# Patient Record
(demographics unavailable — no encounter records)

---

## 2024-12-08 NOTE — REVIEW OF SYSTEMS
[Joint Pain] : joint pain [Joint Stiffness] : joint stiffness [Negative] : Heme/Lymph [FreeTextEntry9] : l-spine, b/l legs/calves

## 2024-12-08 NOTE — DISCUSSION/SUMMARY
[Medication Risks Reviewed] : Medication risks reviewed [de-identified] : Discussed proper body mechanics and modified physical activity to avoid aggravation of symptoms, Tylenol as needed, discussed implementing voltaren gel to affected area. Patient will continue with current lumbar physical therapy routine and incorporate activities taught into their daily life, with the addition of focus on leg and foot muscles- renewal provided. Counseled on continuation on lumbar HEP. C/T LSO for core stabilization while ambulatory. Follow up in 2/3 months.   Prior to appointment and during encounter with patient extensive medical records were reviewed including but not limited to, hospital records, outpatient records, imaging results, and lab data. During this appointment the patient was examined, diagnoses were discussed and explained in a face to face manner. In addition extensive time was spent reviewing aforementioned diagnostic studies. Counseling including abnormal image results, differential diagnoses, treatment options, risk and benefits, lifestyle changes, current condition, and current medications was performed. Patient's comments, questions, and concerns were addressed and patient verbalized understanding. Based on this patient's presentation at our office, which is an orthopedic spine surgeon's office, this patient inherently / intrinsically has a risk, however minute, of developing issues such as Cauda equina syndrome, bowel and bladder changes, or progression of motor or neurological deficits such as paralysis which may be permanent.  REBA MOHR Acting as a Scribe for Dr. Sukhdeep WALKER, Reba Mohr, attest that this documentation has been prepared under the direction and in the presence of Provider Delon Tarango MD.

## 2024-12-08 NOTE — HISTORY OF PRESENT ILLNESS
[6] : 6 [Radiating] : radiating [Sharp] : sharp [Tingling] : tingling [Constant] : constant [Not working due to injury] : Work status: not working due to injury [] : yes [de-identified] : 12/06/2024 - Patient presenting in follow up complaining of back pain. He continues physical therapy which is helpful and would like to continue given his progress. Ambulating with cane. Still has weakness in the lower extremities. No change to general medical health.   09/18/2024 - Patient presenting for WC F/u. He complaints of thoracolumbar pain. Continues stretching exercises and enrolled in formal PT once a week which continues to be helpful and he is making positive progress. Treated with lidocaine patch with some relief. Uses cane for ambulation.   07/03/2024 - Pt presenting in follow up WC. He complains of mid-lower back pain. Walking as tolerated with use of the cane. PT has been very helpful and feels additional sessions would be helpful. LSO is helpful for control of back pain but states he no longer has one.   04/19/2024: Patient presents today for a  follow up. Please note the patient speaks [place] only, and the interview was conducted with the assistance of the  device/service. He states that his symptoms are stable and he c/o low back pain. Currently he takes Tylenol when experiencing pain which helps him to sleep. He has not felt improvement, notes tinging in left foot.  12/20/2023 - Patient presenting for a  follow up. Please note the patient speaks [place] only, and the interview was conducted with the assistance of the  device/service. Symptoms stable, he complains of the same character of low back pain but complains of increased burning sensation in the leg with cold weather changes. He is taking Tylenol at nighttime. No change to general medical health. PT was helpful but has had no recent sessions due to auth issues.   09/18/2023 - Patient presenting for a  follow up. He complains of primarily axial low back pain with intermittent pain into the RLE. He is completing home stretching exercises. Taking meloxicam which is helpful. No change to general medical health.   07/31/2023- Patient presenting for a  follow up visit. Similar symptoms to last visit. Back pain is worse at night, states he is in severe pain. Pain wraps around chest. States pain in legs b/l. Paraesthesia's in legs b/l (R>L), numbness in bottom of right foot. States he can do housework for duration of ~15 minutes before needing to rest. Reports he is staying hydrated. Denies bladder disturbances. No falls as of lately.  06/19/2023 - Please note the patient speaks [place] only, and the interview was conducted with the assistance of the  device/service. c/o nerve like pains in the lower extremity, as well as continued low back pain. not currently enrolled in PT, he is completing routine home stretching and exercising. taking meloxicam which is helpful.   4/14/23 Pt returns for followup of l-spine. He complains pain in lower back radiates to lower extremities. He continues to walk with a cane. He states that he was taking cholesterol pills but the medicine finished.   02/24/2023 -   ID # [id number] used for [place] interpretation.  Patient is participating in PT with good relief.  Patient complains of mainly right lower back pain w/ intermittent paraesthesias radiating in to his right buttock which has been less intense than in the past.  He states he still has plantar flexion weakness bilaterally which has not improved nor gotten worse.  He states his bladder control has improved.  He uses a catheter at night to avoid getting up frequently to go to the the bathroom (transitioned to intermittent straight cath vs indwelling).  He follows closely with a urologist.  No bowel incontinence or new weakness reported.  He uses Acetaminophen for pain PRN.     1/13/23: 60 y/o M presents for follow up visit of lspine pain. He states he has made some improvement since last visit. He states he has some weakness that is persistent, especially with walking on his toes. He has some pain down the right lower extremity. He has been doing HEP and is scheduled to start PT soon.   11/21/2022 - 60 y/o male presents for workers comp follow up. Symptoms remain the same since his last visit. Reports back pain radiating into the RLE. Recently, he started to have pain in the mid back. States prior PT has been helpful for his symptoms in the past. Treatment with Tylenol when needed provides relief. Patient has continued with walking as best tolerated. Patient states his lower back pain gets aggravated intermittently and relieved with Tylenol. No numbness/tingling sensation to lower extremities. No changes to lower extremity strength. Patient states his ROM has slightly improved.     [FreeTextEntry6] : numbness [FreeTextEntry7] : b/l legs/calves  [de-identified] : home ecercises

## 2024-12-08 NOTE — PHYSICAL EXAM
[Normal Coordination] : normal coordination [Normal DTR UE/LE] : normal DTR UE/LE  [Normal Sensation] : normal sensation [Normal Mood and Affect] : normal mood and affect [Oriented] : oriented [Able to Communicate] : able to communicate [Normal Skin] : normal skin [No Rash] : no rash [No Ulcers] : no ulcers [No Lesions] : no lesions [No obvious lymphadenopathy in areas examined] : no obvious lymphadenopathy in areas examined [Well Developed] : well developed [Well Nourished] : well nourished [Peripheral vascular exam is grossly normal] : peripheral vascular exam is grossly normal [No Respiratory Distress] : no respiratory distress [Flexion] : flexion [Extension] : extension [3___] : right plantor flexors 3[unfilled]/5 [5___] : right extensor hallicus longus 5[unfilled]/5 [] : clonus not sustained at ankle [de-identified] : intermittent paraesthesias in the plantar surface of his right foot  [TWNoteComboBox7] : forward flexion 45 degrees [de-identified] : extension 30 degrees

## 2024-12-08 NOTE — DISCUSSION/SUMMARY
[Medication Risks Reviewed] : Medication risks reviewed [de-identified] : Discussed proper body mechanics and modified physical activity to avoid aggravation of symptoms, Tylenol as needed, discussed implementing voltaren gel to affected area. Patient will continue with current lumbar physical therapy routine and incorporate activities taught into their daily life, with the addition of focus on leg and foot muscles- renewal provided. Counseled on continuation on lumbar HEP. C/T LSO for core stabilization while ambulatory. Follow up in 2/3 months.   Prior to appointment and during encounter with patient extensive medical records were reviewed including but not limited to, hospital records, outpatient records, imaging results, and lab data. During this appointment the patient was examined, diagnoses were discussed and explained in a face to face manner. In addition extensive time was spent reviewing aforementioned diagnostic studies. Counseling including abnormal image results, differential diagnoses, treatment options, risk and benefits, lifestyle changes, current condition, and current medications was performed. Patient's comments, questions, and concerns were addressed and patient verbalized understanding. Based on this patient's presentation at our office, which is an orthopedic spine surgeon's office, this patient inherently / intrinsically has a risk, however minute, of developing issues such as Cauda equina syndrome, bowel and bladder changes, or progression of motor or neurological deficits such as paralysis which may be permanent.  REBA MOHR Acting as a Scribe for Dr. Sukhdeep WALKER, Reba Mohr, attest that this documentation has been prepared under the direction and in the presence of Provider Delon Tarango MD.

## 2024-12-08 NOTE — PHYSICAL EXAM
[Normal Coordination] : normal coordination [Normal DTR UE/LE] : normal DTR UE/LE  [Normal Sensation] : normal sensation [Normal Mood and Affect] : normal mood and affect [Oriented] : oriented [Able to Communicate] : able to communicate [Normal Skin] : normal skin [No Rash] : no rash [No Ulcers] : no ulcers [No Lesions] : no lesions [No obvious lymphadenopathy in areas examined] : no obvious lymphadenopathy in areas examined [Well Developed] : well developed [Well Nourished] : well nourished [Peripheral vascular exam is grossly normal] : peripheral vascular exam is grossly normal [No Respiratory Distress] : no respiratory distress [Flexion] : flexion [Extension] : extension [3___] : right plantor flexors 3[unfilled]/5 [5___] : right extensor hallicus longus 5[unfilled]/5 [] : clonus not sustained at ankle [de-identified] : intermittent paraesthesias in the plantar surface of his right foot  [TWNoteComboBox7] : forward flexion 45 degrees [de-identified] : extension 30 degrees

## 2024-12-08 NOTE — HISTORY OF PRESENT ILLNESS
[6] : 6 [Radiating] : radiating [Sharp] : sharp [Tingling] : tingling [Constant] : constant [Not working due to injury] : Work status: not working due to injury [] : yes [de-identified] : 12/06/2024 - Patient presenting in follow up complaining of back pain. He continues physical therapy which is helpful and would like to continue given his progress. Ambulating with cane. Still has weakness in the lower extremities. No change to general medical health.   09/18/2024 - Patient presenting for WC F/u. He complaints of thoracolumbar pain. Continues stretching exercises and enrolled in formal PT once a week which continues to be helpful and he is making positive progress. Treated with lidocaine patch with some relief. Uses cane for ambulation.   07/03/2024 - Pt presenting in follow up WC. He complains of mid-lower back pain. Walking as tolerated with use of the cane. PT has been very helpful and feels additional sessions would be helpful. LSO is helpful for control of back pain but states he no longer has one.   04/19/2024: Patient presents today for a  follow up. Please note the patient speaks [place] only, and the interview was conducted with the assistance of the  device/service. He states that his symptoms are stable and he c/o low back pain. Currently he takes Tylenol when experiencing pain which helps him to sleep. He has not felt improvement, notes tinging in left foot.  12/20/2023 - Patient presenting for a  follow up. Please note the patient speaks [place] only, and the interview was conducted with the assistance of the  device/service. Symptoms stable, he complains of the same character of low back pain but complains of increased burning sensation in the leg with cold weather changes. He is taking Tylenol at nighttime. No change to general medical health. PT was helpful but has had no recent sessions due to auth issues.   09/18/2023 - Patient presenting for a  follow up. He complains of primarily axial low back pain with intermittent pain into the RLE. He is completing home stretching exercises. Taking meloxicam which is helpful. No change to general medical health.   07/31/2023- Patient presenting for a  follow up visit. Similar symptoms to last visit. Back pain is worse at night, states he is in severe pain. Pain wraps around chest. States pain in legs b/l. Paraesthesia's in legs b/l (R>L), numbness in bottom of right foot. States he can do housework for duration of ~15 minutes before needing to rest. Reports he is staying hydrated. Denies bladder disturbances. No falls as of lately.  06/19/2023 - Please note the patient speaks [place] only, and the interview was conducted with the assistance of the  device/service. c/o nerve like pains in the lower extremity, as well as continued low back pain. not currently enrolled in PT, he is completing routine home stretching and exercising. taking meloxicam which is helpful.   4/14/23 Pt returns for followup of l-spine. He complains pain in lower back radiates to lower extremities. He continues to walk with a cane. He states that he was taking cholesterol pills but the medicine finished.   02/24/2023 -   ID # [id number] used for [place] interpretation.  Patient is participating in PT with good relief.  Patient complains of mainly right lower back pain w/ intermittent paraesthesias radiating in to his right buttock which has been less intense than in the past.  He states he still has plantar flexion weakness bilaterally which has not improved nor gotten worse.  He states his bladder control has improved.  He uses a catheter at night to avoid getting up frequently to go to the the bathroom (transitioned to intermittent straight cath vs indwelling).  He follows closely with a urologist.  No bowel incontinence or new weakness reported.  He uses Acetaminophen for pain PRN.     1/13/23: 58 y/o M presents for follow up visit of lspine pain. He states he has made some improvement since last visit. He states he has some weakness that is persistent, especially with walking on his toes. He has some pain down the right lower extremity. He has been doing HEP and is scheduled to start PT soon.   11/21/2022 - 58 y/o male presents for workers comp follow up. Symptoms remain the same since his last visit. Reports back pain radiating into the RLE. Recently, he started to have pain in the mid back. States prior PT has been helpful for his symptoms in the past. Treatment with Tylenol when needed provides relief. Patient has continued with walking as best tolerated. Patient states his lower back pain gets aggravated intermittently and relieved with Tylenol. No numbness/tingling sensation to lower extremities. No changes to lower extremity strength. Patient states his ROM has slightly improved.     [FreeTextEntry6] : numbness [FreeTextEntry7] : b/l legs/calves  [de-identified] : home ecercises

## 2025-03-22 NOTE — PHYSICAL EXAM
[Normal Coordination] : normal coordination [Normal DTR UE/LE] : normal DTR UE/LE  [Normal Sensation] : normal sensation [Normal Mood and Affect] : normal mood and affect [Oriented] : oriented [Able to Communicate] : able to communicate [Normal Skin] : normal skin [No Rash] : no rash [No Ulcers] : no ulcers [No Lesions] : no lesions [No obvious lymphadenopathy in areas examined] : no obvious lymphadenopathy in areas examined [Well Developed] : well developed [Well Nourished] : well nourished [Peripheral vascular exam is grossly normal] : peripheral vascular exam is grossly normal [No Respiratory Distress] : no respiratory distress [Flexion] : flexion [Extension] : extension [3___] : right plantor flexors 3[unfilled]/5 [5___] : right extensor hallicus longus 5[unfilled]/5 [] : clonus not sustained at ankle [de-identified] : intermittent paraesthesias in the plantar surface of his right foot  [TWNoteComboBox7] : forward flexion 45 degrees [de-identified] : extension 30 degrees

## 2025-03-22 NOTE — DISCUSSION/SUMMARY
[Medication Risks Reviewed] : Medication risks reviewed [de-identified] : Discussed proper body mechanics and modified physical activity to avoid aggravation of symptoms, Tylenol as needed, discussed implementing voltaren gel to affected area. Patient will continue with current lumbar physical therapy routine and incorporate activities taught into their daily life, with the addition of focus on leg and foot muscles- renewal provided. Counseled on continuation on lumbar HEP. C/T LSO for core stabilization while ambulatory.   Follow up in 3 months.   Prior to appointment and during encounter with patient extensive medical records were reviewed including but not limited to, hospital records, outpatient records, imaging results, and lab data. During this appointment the patient was examined, diagnoses were discussed and explained in a face to face manner. In addition extensive time was spent reviewing aforementioned diagnostic studies. Counseling including abnormal image results, differential diagnoses, treatment options, risk and benefits, lifestyle changes, current condition, and current medications was performed. Patient's comments, questions, and concerns were addressed and patient verbalized understanding. Based on this patient's presentation at our office, which is an orthopedic spine surgeon's office, this patient inherently / intrinsically has a risk, however minute, of developing issues such as Cauda equina syndrome, bowel and bladder changes, or progression of motor or neurological deficits such as paralysis which may be permanent.   I, Vanessa Meneses, attest that this documentation has been prepared under the direction and in the presence of Provider Delon Tarango MD..s

## 2025-03-22 NOTE — PHYSICAL EXAM
[Normal Coordination] : normal coordination [Normal DTR UE/LE] : normal DTR UE/LE  [Normal Sensation] : normal sensation [Normal Mood and Affect] : normal mood and affect [Oriented] : oriented [Able to Communicate] : able to communicate [Normal Skin] : normal skin [No Rash] : no rash [No Ulcers] : no ulcers [No Lesions] : no lesions [No obvious lymphadenopathy in areas examined] : no obvious lymphadenopathy in areas examined [Well Developed] : well developed [Well Nourished] : well nourished [Peripheral vascular exam is grossly normal] : peripheral vascular exam is grossly normal [No Respiratory Distress] : no respiratory distress [Flexion] : flexion [Extension] : extension [3___] : right plantor flexors 3[unfilled]/5 [5___] : right extensor hallicus longus 5[unfilled]/5 [] : clonus not sustained at ankle [de-identified] : intermittent paraesthesias in the plantar surface of his right foot  [TWNoteComboBox7] : forward flexion 45 degrees [de-identified] : extension 30 degrees

## 2025-03-22 NOTE — DISCUSSION/SUMMARY
[Medication Risks Reviewed] : Medication risks reviewed [de-identified] : Discussed proper body mechanics and modified physical activity to avoid aggravation of symptoms, Tylenol as needed, discussed implementing voltaren gel to affected area. Patient will continue with current lumbar physical therapy routine and incorporate activities taught into their daily life, with the addition of focus on leg and foot muscles- renewal provided. Counseled on continuation on lumbar HEP. C/T LSO for core stabilization while ambulatory.   Follow up in 3 months.   Prior to appointment and during encounter with patient extensive medical records were reviewed including but not limited to, hospital records, outpatient records, imaging results, and lab data. During this appointment the patient was examined, diagnoses were discussed and explained in a face to face manner. In addition extensive time was spent reviewing aforementioned diagnostic studies. Counseling including abnormal image results, differential diagnoses, treatment options, risk and benefits, lifestyle changes, current condition, and current medications was performed. Patient's comments, questions, and concerns were addressed and patient verbalized understanding. Based on this patient's presentation at our office, which is an orthopedic spine surgeon's office, this patient inherently / intrinsically has a risk, however minute, of developing issues such as Cauda equina syndrome, bowel and bladder changes, or progression of motor or neurological deficits such as paralysis which may be permanent.   I, Vnaessa Meneses, attest that this documentation has been prepared under the direction and in the presence of Provider Delon Tarango MD..s

## 2025-03-22 NOTE — HISTORY OF PRESENT ILLNESS
[6] : 6 [Radiating] : radiating [Sharp] : sharp [Tingling] : tingling [Constant] : constant [Not working due to injury] : Work status: not working due to injury [] : yes [de-identified] : 03/21/2025: Patient presents for follow up visit.  He is still having loss in strength in his lower extremities. Reports his back is more sensitive to the cold.  He does use the back brace from time to time when pain is worsening   12/06/2024 - Patient presenting in follow up complaining of back pain. He continues physical therapy which is helpful and would like to continue given his progress. Ambulating with cane. Still has weakness in the lower extremities. No change to general medical health.   09/18/2024 - Patient presenting for WC F/u. He complaints of thoracolumbar pain. Continues stretching exercises and enrolled in formal PT once a week which continues to be helpful and he is making positive progress. Treated with lidocaine patch with some relief. Uses cane for ambulation.   07/03/2024 - Pt presenting in follow up WC. He complains of mid-lower back pain. Walking as tolerated with use of the cane. PT has been very helpful and feels additional sessions would be helpful. LSO is helpful for control of back pain but states he no longer has one.   04/19/2024: Patient presents today for a  follow up. Please note the patient speaks [place] only, and the interview was conducted with the assistance of the  device/service. He states that his symptoms are stable and he c/o low back pain. Currently he takes Tylenol when experiencing pain which helps him to sleep. He has not felt improvement, notes tinging in left foot.  12/20/2023 - Patient presenting for a  follow up. Please note the patient speaks Vietnamese only, and the interview was conducted with the assistance of the  device/service. Symptoms stable, he complains of the same character of low back pain but complains of increased burning sensation in the leg with cold weather changes. He is taking Tylenol at nighttime. No change to general medical health. PT was helpful but has had no recent sessions due to auth issues.   09/18/2023 - Patient presenting for a  follow up. He complains of primarily axial low back pain with intermittent pain into the RLE. He is completing home stretching exercises. Taking meloxicam which is helpful. No change to general medical health.   07/31/2023- Patient presenting for a  follow up visit. Similar symptoms to last visit. Back pain is worse at night, states he is in severe pain. Pain wraps around chest. States pain in legs b/l. Paraesthesia's in legs b/l (R>L), numbness in bottom of right foot. States he can do housework for duration of ~15 minutes before needing to rest. Reports he is staying hydrated. Denies bladder disturbances. No falls as of lately.  06/19/2023 - Please note the patient speaks Vietnamese only, and the interview was conducted with the assistance of the  device/service. c/o nerve like pains in the lower extremity, as well as continued low back pain. not currently enrolled in PT, he is completing routine home stretching and exercising. taking meloxicam which is helpful.   4/14/23 Pt returns for followup of l-spine. He complains pain in lower back radiates to lower extremities. He continues to walk with a cane. He states that he was taking cholesterol pills but the medicine finished.   02/24/2023 -   ID # [id number] used for Vietnamese interpretation.  Patient is participating in PT with good relief.  Patient complains of mainly right lower back pain w/ intermittent paraesthesias radiating in to his right buttock which has been less intense than in the past.  He states he still has plantar flexion weakness bilaterally which has not improved nor gotten worse.  He states his bladder control has improved.  He uses a catheter at night to avoid getting up frequently to go to the the bathroom (transitioned to intermittent straight cath vs indwelling).  He follows closely with a urologist.  No bowel incontinence or new weakness reported.  He uses Acetaminophen for pain PRN.     1/13/23: 60 y/o M presents for follow up visit of lspine pain. He states he has made some improvement since last visit. He states he has some weakness that is persistent, especially with walking on his toes. He has some pain down the right lower extremity. He has been doing HEP and is scheduled to start PT soon.   11/21/2022 - 60 y/o male presents for workers comp follow up. Symptoms remain the same since his last visit. Reports back pain radiating into the RLE. Recently, he started to have pain in the mid back. States prior PT has been helpful for his symptoms in the past. Treatment with Tylenol when needed provides relief. Patient has continued with walking as best tolerated. Patient states his lower back pain gets aggravated intermittently and relieved with Tylenol. No numbness/tingling sensation to lower extremities. No changes to lower extremity strength. Patient states his ROM has slightly improved.     [FreeTextEntry6] : numbness [FreeTextEntry7] : b/l legs/calves  [de-identified] : home ecercises

## 2025-03-22 NOTE — HISTORY OF PRESENT ILLNESS
[6] : 6 [Radiating] : radiating [Sharp] : sharp [Tingling] : tingling [Constant] : constant [Not working due to injury] : Work status: not working due to injury [] : yes [de-identified] : 03/21/2025: Patient presents for follow up visit.  He is still having loss in strength in his lower extremities. Reports his back is more sensitive to the cold.  He does use the back brace from time to time when pain is worsening   12/06/2024 - Patient presenting in follow up complaining of back pain. He continues physical therapy which is helpful and would like to continue given his progress. Ambulating with cane. Still has weakness in the lower extremities. No change to general medical health.   09/18/2024 - Patient presenting for WC F/u. He complaints of thoracolumbar pain. Continues stretching exercises and enrolled in formal PT once a week which continues to be helpful and he is making positive progress. Treated with lidocaine patch with some relief. Uses cane for ambulation.   07/03/2024 - Pt presenting in follow up WC. He complains of mid-lower back pain. Walking as tolerated with use of the cane. PT has been very helpful and feels additional sessions would be helpful. LSO is helpful for control of back pain but states he no longer has one.   04/19/2024: Patient presents today for a  follow up. Please note the patient speaks Afghan only, and the interview was conducted with the assistance of the  device/service. He states that his symptoms are stable and he c/o low back pain. Currently he takes Tylenol when experiencing pain which helps him to sleep. He has not felt improvement, notes tinging in left foot.  12/20/2023 - Patient presenting for a  follow up. Please note the patient speaks Afghan only, and the interview was conducted with the assistance of the  device/service. Symptoms stable, he complains of the same character of low back pain but complains of increased burning sensation in the leg with cold weather changes. He is taking Tylenol at nighttime. No change to general medical health. PT was helpful but has had no recent sessions due to auth issues.   09/18/2023 - Patient presenting for a  follow up. He complains of primarily axial low back pain with intermittent pain into the RLE. He is completing home stretching exercises. Taking meloxicam which is helpful. No change to general medical health.   07/31/2023- Patient presenting for a  follow up visit. Similar symptoms to last visit. Back pain is worse at night, states he is in severe pain. Pain wraps around chest. States pain in legs b/l. Paraesthesia's in legs b/l (R>L), numbness in bottom of right foot. States he can do housework for duration of ~15 minutes before needing to rest. Reports he is staying hydrated. Denies bladder disturbances. No falls as of lately.  06/19/2023 - Please note the patient speaks Afghan only, and the interview was conducted with the assistance of the  device/service. c/o nerve like pains in the lower extremity, as well as continued low back pain. not currently enrolled in PT, he is completing routine home stretching and exercising. taking meloxicam which is helpful.   4/14/23 Pt returns for followup of l-spine. He complains pain in lower back radiates to lower extremities. He continues to walk with a cane. He states that he was taking cholesterol pills but the medicine finished.   02/24/2023 -   ID # [id number] used for Afghan interpretation.  Patient is participating in PT with good relief.  Patient complains of mainly right lower back pain w/ intermittent paraesthesias radiating in to his right buttock which has been less intense than in the past.  He states he still has plantar flexion weakness bilaterally which has not improved nor gotten worse.  He states his bladder control has improved.  He uses a catheter at night to avoid getting up frequently to go to the the bathroom (transitioned to intermittent straight cath vs indwelling).  He follows closely with a urologist.  No bowel incontinence or new weakness reported.  He uses Acetaminophen for pain PRN.     1/13/23: 58 y/o M presents for follow up visit of lspine pain. He states he has made some improvement since last visit. He states he has some weakness that is persistent, especially with walking on his toes. He has some pain down the right lower extremity. He has been doing HEP and is scheduled to start PT soon.   11/21/2022 - 58 y/o male presents for workers comp follow up. Symptoms remain the same since his last visit. Reports back pain radiating into the RLE. Recently, he started to have pain in the mid back. States prior PT has been helpful for his symptoms in the past. Treatment with Tylenol when needed provides relief. Patient has continued with walking as best tolerated. Patient states his lower back pain gets aggravated intermittently and relieved with Tylenol. No numbness/tingling sensation to lower extremities. No changes to lower extremity strength. Patient states his ROM has slightly improved.     [FreeTextEntry7] : b/l legs/calves  [FreeTextEntry6] : numbness [de-identified] : home ecercises

## 2025-05-25 NOTE — PHYSICAL EXAM
[Normal Coordination] : normal coordination [Normal DTR UE/LE] : normal DTR UE/LE  [Normal Sensation] : normal sensation [Normal Mood and Affect] : normal mood and affect [Oriented] : oriented [Able to Communicate] : able to communicate [Normal Skin] : normal skin [No Rash] : no rash [No Ulcers] : no ulcers [No Lesions] : no lesions [No obvious lymphadenopathy in areas examined] : no obvious lymphadenopathy in areas examined [Well Developed] : well developed [Well Nourished] : well nourished [Peripheral vascular exam is grossly normal] : peripheral vascular exam is grossly normal [No Respiratory Distress] : no respiratory distress [Flexion] : flexion [Extension] : extension [3___] : right plantor flexors 3[unfilled]/5 [5___] : right extensor hallicus longus 5[unfilled]/5 [] : clonus not sustained at ankle [de-identified] : intermittent paraesthesias in the plantar surface of his right foot  [TWNoteComboBox7] : forward flexion 45 degrees [de-identified] : extension 30 degrees

## 2025-05-25 NOTE — HISTORY OF PRESENT ILLNESS
[6] : 6 [Radiating] : radiating [Sharp] : sharp [Tingling] : tingling [Constant] : constant [Not working due to injury] : Work status: not working due to injury [] : yes [de-identified] : 05/21/2025: Patient presents for a W/C follow up visit. His condition is essentially stable. He continues to ambulate with a cane in the community and he's able to ambulate without inside the home. He remains engaged with physical therapy. He is continuing with home exercise protocol. Tylenol for pain at this Time. He is not using anything stronger. Weakness in the lower extremities is stable and non-progressive  03/21/2025: Patient presents for follow up visit.  He is still having loss in strength in his lower extremities. Reports his back is more sensitive to the cold.  He does use the back brace from time to time when pain is worsening   12/06/2024 - Patient presenting in follow up complaining of back pain. He continues physical therapy which is helpful and would like to continue given his progress. Ambulating with cane. Still has weakness in the lower extremities. No change to general medical health.   09/18/2024 - Patient presenting for  F/u. He complaints of thoracolumbar pain. Continues stretching exercises and enrolled in formal PT once a week which continues to be helpful and he is making positive progress. Treated with lidocaine patch with some relief. Uses cane for ambulation.   07/03/2024 - Pt presenting in follow up WC. He complains of mid-lower back pain. Walking as tolerated with use of the cane. PT has been very helpful and feels additional sessions would be helpful. LSO is helpful for control of back pain but states he no longer has one.   04/19/2024: Patient presents today for a  follow up. Please note the patient speaks Micronesian only, and the interview was conducted with the assistance of the  device/service. He states that his symptoms are stable and he c/o low back pain. Currently he takes Tylenol when experiencing pain which helps him to sleep. He has not felt improvement, notes tinging in left foot.  12/20/2023 - Patient presenting for a  follow up. Please note the patient speaks Micronesian only, and the interview was conducted with the assistance of the  device/service. Symptoms stable, he complains of the same character of low back pain but complains of increased burning sensation in the leg with cold weather changes. He is taking Tylenol at nighttime. No change to general medical health. PT was helpful but has had no recent sessions due to auth issues.   09/18/2023 - Patient presenting for a WC follow up. He complains of primarily axial low back pain with intermittent pain into the RLE. He is completing home stretching exercises. Taking meloxicam which is helpful. No change to general medical health.   07/31/2023- Patient presenting for a WC follow up visit. Similar symptoms to last visit. Back pain is worse at night, states he is in severe pain. Pain wraps around chest. States pain in legs b/l. Paraesthesia's in legs b/l (R>L), numbness in bottom of right foot. States he can do housework for duration of ~15 minutes before needing to rest. Reports he is staying hydrated. Denies bladder disturbances. No falls as of lately.  06/19/2023 - Please note the patient speaks Micronesian only, and the interview was conducted with the assistance of the  device/service. c/o nerve like pains in the lower extremity, as well as continued low back pain. not currently enrolled in PT, he is completing routine home stretching and exercising. taking meloxicam which is helpful.   4/14/23 Pt returns for followup of l-spine. He complains pain in lower back radiates to lower extremities. He continues to walk with a cane. He states that he was taking cholesterol pills but the medicine finished.   02/24/2023 -   ID # [id number] used for Micronesian interpretation.  Patient is participating in PT with good relief.  Patient complains of mainly right lower back pain w/ intermittent paraesthesias radiating in to his right buttock which has been less intense than in the past.  He states he still has plantar flexion weakness bilaterally which has not improved nor gotten worse.  He states his bladder control has improved.  He uses a catheter at night to avoid getting up frequently to go to the the bathroom (transitioned to intermittent straight cath vs indwelling).  He follows closely with a urologist.  No bowel incontinence or new weakness reported.  He uses Acetaminophen for pain PRN.     1/13/23: 60 y/o M presents for follow up visit of lspine pain. He states he has made some improvement since last visit. He states he has some weakness that is persistent, especially with walking on his toes. He has some pain down the right lower extremity. He has been doing HEP and is scheduled to start PT soon.   11/21/2022 - 60 y/o male presents for workers comp follow up. Symptoms remain the same since his last visit. Reports back pain radiating into the RLE. Recently, he started to have pain in the mid back. States prior PT has been helpful for his symptoms in the past. Treatment with Tylenol when needed provides relief. Patient has continued with walking as best tolerated. Patient states his lower back pain gets aggravated intermittently and relieved with Tylenol. No numbness/tingling sensation to lower extremities. No changes to lower extremity strength. Patient states his ROM has slightly improved.     [FreeTextEntry6] : numbness [FreeTextEntry7] : b/l legs/calves  [de-identified] : home ecercises

## 2025-05-25 NOTE — DISCUSSION/SUMMARY
[Medication Risks Reviewed] : Medication risks reviewed [de-identified] : Patient follow up in the office in three months for reevaluation. He should continue with exercise based rehabilitation and core muscle stabilization. No changes to his treatment regiment at this time. He will continue to utilize a cane. Will consider x-ray at next visit. Last radiographs were obtained 8/2024.  Prior to appointment and during encounter with patient extensive medical records were reviewed including but not limited to, hospital records, outpatient records, imaging results, and lab data. During this appointment the patient was examined, diagnoses were discussed and explained in a face to face manner. In addition extensive time was spent reviewing aforementioned diagnostic studies. Counseling including abnormal image results, differential diagnoses, treatment options, risk and benefits, lifestyle changes, current condition, and current medications was performed. Patient's comments, questions, and concerns were addressed and patient verbalized understanding. Based on this patient's presentation at our office, which is an orthopedic spine surgeon's office, this patient inherently / intrinsically has a risk, however minute, of developing issues such as Cauda equina syndrome, bowel and bladder changes, or progression of motor or neurological deficits such as paralysis which may be permanent.   I, [WILMAR Nunes], certify that the clinical information was reviewed with Dr. Tarango, who was physically present in the office. He agreed with the above plan of care and was available for consultation as necessary during the office visit.

## 2025-05-25 NOTE — HISTORY OF PRESENT ILLNESS
[6] : 6 [Radiating] : radiating [Sharp] : sharp [Tingling] : tingling [Constant] : constant [Not working due to injury] : Work status: not working due to injury [] : yes [de-identified] : 05/21/2025: Patient presents for a W/C follow up visit. His condition is essentially stable. He continues to ambulate with a cane in the community and he's able to ambulate without inside the home. He remains engaged with physical therapy. He is continuing with home exercise protocol. Tylenol for pain at this Time. He is not using anything stronger. Weakness in the lower extremities is stable and non-progressive  03/21/2025: Patient presents for follow up visit.  He is still having loss in strength in his lower extremities. Reports his back is more sensitive to the cold.  He does use the back brace from time to time when pain is worsening   12/06/2024 - Patient presenting in follow up complaining of back pain. He continues physical therapy which is helpful and would like to continue given his progress. Ambulating with cane. Still has weakness in the lower extremities. No change to general medical health.   09/18/2024 - Patient presenting for  F/u. He complaints of thoracolumbar pain. Continues stretching exercises and enrolled in formal PT once a week which continues to be helpful and he is making positive progress. Treated with lidocaine patch with some relief. Uses cane for ambulation.   07/03/2024 - Pt presenting in follow up WC. He complains of mid-lower back pain. Walking as tolerated with use of the cane. PT has been very helpful and feels additional sessions would be helpful. LSO is helpful for control of back pain but states he no longer has one.   04/19/2024: Patient presents today for a  follow up. Please note the patient speaks [place] Islander only, and the interview was conducted with the assistance of the  device/service. He states that his symptoms are stable and he c/o low back pain. Currently he takes Tylenol when experiencing pain which helps him to sleep. He has not felt improvement, notes tinging in left foot.  12/20/2023 - Patient presenting for a  follow up. Please note the patient speaks [place] Island[place] only, and the interview was conducted with the assistance of the  device/service. Symptoms stable, he complains of the same character of low back pain but complains of increased burning sensation in the leg with cold weather changes. He is taking Tylenol at nighttime. No change to general medical health. PT was helpful but has had no recent sessions due to auth issues.   09/18/2023 - Patient presenting for a WC follow up. He complains of primarily axial low back pain with intermittent pain into the RLE. He is completing home stretching exercises. Taking meloxicam which is helpful. No change to general medical health.   07/31/2023- Patient presenting for a WC follow up visit. Similar symptoms to last visit. Back pain is worse at night, states he is in severe pain. Pain wraps around chest. States pain in legs b/l. Paraesthesia's in legs b/l (R>L), numbness in bottom of right foot. States he can do housework for duration of ~15 minutes before needing to rest. Reports he is staying hydrated. Denies bladder disturbances. No falls as of lately.  06/19/2023 - Please note the patient speaks [place] Island[place] only, and the interview was conducted with the assistance of the  device/service. c/o nerve like pains in the lower extremity, as well as continued low back pain. not currently enrolled in PT, he is completing routine home stretching and exercising. taking meloxicam which is helpful.   4/14/23 Pt returns for followup of l-spine. He complains pain in lower back radiates to lower extremities. He continues to walk with a cane. He states that he was taking cholesterol pills but the medicine finished.   02/24/2023 -   ID # [id number] used for [place] Islander interpretation.  Patient is participating in PT with good relief.  Patient complains of mainly right lower back pain w/ intermittent paraesthesias radiating in to his right buttock which has been less intense than in the past.  He states he still has plantar flexion weakness bilaterally which has not improved nor gotten worse.  He states his bladder control has improved.  He uses a catheter at night to avoid getting up frequently to go to the the bathroom (transitioned to intermittent straight cath vs indwelling).  He follows closely with a urologist.  No bowel incontinence or new weakness reported.  He uses Acetaminophen for pain PRN.     1/13/23: 60 y/o M presents for follow up visit of lspine pain. He states he has made some improvement since last visit. He states he has some weakness that is persistent, especially with walking on his toes. He has some pain down the right lower extremity. He has been doing HEP and is scheduled to start PT soon.   11/21/2022 - 60 y/o male presents for workers comp follow up. Symptoms remain the same since his last visit. Reports back pain radiating into the RLE. Recently, he started to have pain in the mid back. States prior PT has been helpful for his symptoms in the past. Treatment with Tylenol when needed provides relief. Patient has continued with walking as best tolerated. Patient states his lower back pain gets aggravated intermittently and relieved with Tylenol. No numbness/tingling sensation to lower extremities. No changes to lower extremity strength. Patient states his ROM has slightly improved.     [FreeTextEntry6] : numbness [FreeTextEntry7] : b/l legs/calves  [de-identified] : home ecercises

## 2025-05-25 NOTE — PHYSICAL EXAM
[Normal Coordination] : normal coordination [Normal DTR UE/LE] : normal DTR UE/LE  [Normal Sensation] : normal sensation [Normal Mood and Affect] : normal mood and affect [Oriented] : oriented [Able to Communicate] : able to communicate [Normal Skin] : normal skin [No Rash] : no rash [No Ulcers] : no ulcers [No Lesions] : no lesions [No obvious lymphadenopathy in areas examined] : no obvious lymphadenopathy in areas examined [Well Developed] : well developed [Well Nourished] : well nourished [Peripheral vascular exam is grossly normal] : peripheral vascular exam is grossly normal [No Respiratory Distress] : no respiratory distress [Flexion] : flexion [Extension] : extension [3___] : right plantor flexors 3[unfilled]/5 [5___] : right extensor hallicus longus 5[unfilled]/5 [] : clonus not sustained at ankle [de-identified] : intermittent paraesthesias in the plantar surface of his right foot  [TWNoteComboBox7] : forward flexion 45 degrees [de-identified] : extension 30 degrees

## 2025-05-25 NOTE — DISCUSSION/SUMMARY
[Medication Risks Reviewed] : Medication risks reviewed [de-identified] : Patient follow up in the office in three months for reevaluation. He should continue with exercise based rehabilitation and core muscle stabilization. No changes to his treatment regiment at this time. He will continue to utilize a cane. Will consider x-ray at next visit. Last radiographs were obtained 8/2024.  Prior to appointment and during encounter with patient extensive medical records were reviewed including but not limited to, hospital records, outpatient records, imaging results, and lab data. During this appointment the patient was examined, diagnoses were discussed and explained in a face to face manner. In addition extensive time was spent reviewing aforementioned diagnostic studies. Counseling including abnormal image results, differential diagnoses, treatment options, risk and benefits, lifestyle changes, current condition, and current medications was performed. Patient's comments, questions, and concerns were addressed and patient verbalized understanding. Based on this patient's presentation at our office, which is an orthopedic spine surgeon's office, this patient inherently / intrinsically has a risk, however minute, of developing issues such as Cauda equina syndrome, bowel and bladder changes, or progression of motor or neurological deficits such as paralysis which may be permanent.   I, [WILMAR Nunes], certify that the clinical information was reviewed with Dr. Tarango, who was physically present in the office. He agreed with the above plan of care and was available for consultation as necessary during the office visit.